# Patient Record
Sex: MALE | Race: WHITE | URBAN - METROPOLITAN AREA
[De-identification: names, ages, dates, MRNs, and addresses within clinical notes are randomized per-mention and may not be internally consistent; named-entity substitution may affect disease eponyms.]

---

## 2017-04-19 ENCOUNTER — TRANSFERRED RECORDS (OUTPATIENT)
Dept: HEALTH INFORMATION MANAGEMENT | Facility: CLINIC | Age: 82
End: 2017-04-19

## 2017-05-08 ENCOUNTER — TRANSFERRED RECORDS (OUTPATIENT)
Dept: HEALTH INFORMATION MANAGEMENT | Facility: CLINIC | Age: 82
End: 2017-05-08

## 2017-05-22 ENCOUNTER — TRANSFERRED RECORDS (OUTPATIENT)
Dept: HEALTH INFORMATION MANAGEMENT | Facility: CLINIC | Age: 82
End: 2017-05-22

## 2017-06-01 ENCOUNTER — TRANSFERRED RECORDS (OUTPATIENT)
Dept: HEALTH INFORMATION MANAGEMENT | Facility: CLINIC | Age: 82
End: 2017-06-01

## 2017-06-21 ENCOUNTER — TRANSFERRED RECORDS (OUTPATIENT)
Dept: HEALTH INFORMATION MANAGEMENT | Facility: CLINIC | Age: 82
End: 2017-06-21

## 2017-06-22 ENCOUNTER — TRANSFERRED RECORDS (OUTPATIENT)
Dept: HEALTH INFORMATION MANAGEMENT | Facility: CLINIC | Age: 82
End: 2017-06-22

## 2017-06-23 ENCOUNTER — TRANSFERRED RECORDS (OUTPATIENT)
Dept: HEALTH INFORMATION MANAGEMENT | Facility: CLINIC | Age: 82
End: 2017-06-23

## 2017-07-10 ENCOUNTER — MEDICAL CORRESPONDENCE (OUTPATIENT)
Dept: HEALTH INFORMATION MANAGEMENT | Facility: CLINIC | Age: 82
End: 2017-07-10

## 2017-07-10 ENCOUNTER — TRANSFERRED RECORDS (OUTPATIENT)
Dept: HEALTH INFORMATION MANAGEMENT | Facility: CLINIC | Age: 82
End: 2017-07-10

## 2017-07-14 ENCOUNTER — TRANSFERRED RECORDS (OUTPATIENT)
Dept: HEALTH INFORMATION MANAGEMENT | Facility: CLINIC | Age: 82
End: 2017-07-14

## 2017-07-21 ENCOUNTER — MEDICAL CORRESPONDENCE (OUTPATIENT)
Dept: HEALTH INFORMATION MANAGEMENT | Facility: CLINIC | Age: 82
End: 2017-07-21

## 2017-07-21 ENCOUNTER — TRANSFERRED RECORDS (OUTPATIENT)
Dept: HEALTH INFORMATION MANAGEMENT | Facility: CLINIC | Age: 82
End: 2017-07-21

## 2017-08-07 ENCOUNTER — OFFICE VISIT (OUTPATIENT)
Dept: RADIATION THERAPY | Facility: OUTPATIENT CENTER | Age: 82
End: 2017-08-07

## 2017-08-07 VITALS
HEART RATE: 59 BPM | WEIGHT: 199.6 LBS | OXYGEN SATURATION: 97 % | HEIGHT: 68 IN | RESPIRATION RATE: 16 BRPM | SYSTOLIC BLOOD PRESSURE: 160 MMHG | BODY MASS INDEX: 30.25 KG/M2 | DIASTOLIC BLOOD PRESSURE: 90 MMHG

## 2017-08-07 DIAGNOSIS — C34.31 MALIGNANT NEOPLASM OF LOWER LOBE OF RIGHT LUNG (H): Primary | ICD-10-CM

## 2017-08-07 DIAGNOSIS — C34.11 MALIGNANT NEOPLASM OF UPPER LOBE OF RIGHT LUNG (H): ICD-10-CM

## 2017-08-07 RX ORDER — HYDROCHLOROTHIAZIDE 25 MG/1
25 TABLET ORAL DAILY
COMMUNITY

## 2017-08-07 RX ORDER — SIMVASTATIN 20 MG
20 TABLET ORAL AT BEDTIME
COMMUNITY

## 2017-08-07 ASSESSMENT — PAIN SCALES - GENERAL: PAINLEVEL: NO PAIN (0)

## 2017-08-07 NOTE — MR AVS SNAPSHOT
After Visit Summary   2017    Timi Singh    MRN: [id number]           Patient Information     Date Of Birth          1935        Visit Information        Provider Department      2017 3:30 PM [facility] Rad; Kymberly Schulte MD [facility]        Today's Diagnoses     Malignant neoplasm of lower lobe of right lung (H)    -  1       Follow-ups after your visit        Who to contact     Please call your clinic at [phone] to:    Ask questions about your health    Make or cancel appointments    Discuss your medicines    Learn about your test results    Speak to your doctor   If you have compliments or concerns about an experience at your clinic, or if you wish to file a complaint, please contact [facility] Patient Relations at [phone] or email us at [email or web address]         Additional Information About Your Visit        MyChart Information     [employer] is an electronic gateway that provides easy, online access to your medical records. With [employer], you can request a clinic appointment, read your test results, renew a prescription or communicate with your care team.     To sign up for [employer] visit the website at [email or web address]   You will be asked to enter the access code listed below, as well as some personal information. Please follow the directions to create your username and password.     Your access code is: [email or web address]  Expires: 2017  2:50 PM     Your access code will  in 90 days. If you need help or a new code, please contact your [facility] Clinic or call [phone] for assistance.        Care EveryWhere ID     This is your Care EveryWhere ID. This could be used by other organizations to access your [place] medical records  [id number]         Blood Pressure from Last 3 Encounters:   17 160/90    Weight from Last 3 Encounters:   17 90.5 kg (199 lb 9.6 oz)               Today, you had the following     No orders found for display         Today's Medication Changes          These changes are accurate as of: 8/7/17  4:01 PM.  If you have any questions, ask your nurse or doctor.               Stop taking these medicines if you haven't already. Please contact your care team if you have questions.     erythromycin 2 % Oint   Stopped by:  Kymberly Schulte MD                    Primary Care Provider    None Specified       No primary provider on file.        Equal Access to Services     [facility]: Hadii mateo ku hadallyssao Soangelinaali, waaxda luqadaha, qaybta kaalmada adeeileenkirstenda, waxay idiin haygabemarcelina mckeeramajeffery cross . So [facility] [phone].    ATENCIÓN: Si waqas conti, tiene a anna disposición servicios gratuitos de asistencia lingüística. [facility] [phone].    We comply with applicable federal civil rights laws and [place] laws. We do not discriminate on the basis of race, color, national origin, age, disability sex, sexual orientation or gender identity.            Thank you!     Thank you for choosing [facility]  for your care. Our goal is always to provide you with excellent care. Hearing back from our patients is one way we can continue to improve our services. Please take a few minutes to complete the written survey that you may receive in the mail after your visit with us. Thank you!             Your Updated Medication List - Protect others around you: Learn how to safely use, store and throw away your medicines at www.disposemymeds.org.          This list is accurate as of: 8/7/17  4:01 PM.  Always use your most recent med list.                   Brand Name Dispense Instructions for use Diagnosis    aspirin 81 MG tablet      Take 81 mg by mouth daily        hydrochlorothiazide 25 MG tablet    HYDRODIURIL     Take 25 mg by mouth daily        omeprazole 20 MG CR capsule    priLOSEC     Take 20 mg by mouth daily        simvastatin 20 MG tablet    ZOCOR     Take  20 mg by mouth At Bedtime        SYNTHROID PO      Take 0.1 mcg by mouth daily

## 2017-08-07 NOTE — PROGRESS NOTES
RADIATION ONCOLOGY CONSULT NOTE  DATE OF CONSULTATION: 2017  CSN: [id number]    PATIENT NAME: Timi Singh  MRN: [id number]  : 1935    Jamison Guillermo MD  [place] [address] [place] [id number]    Dear Dr. Guillermo:    Thank you very much for referring this patient for consideration of radiotherapy. As you know Mr. Singh is a 82 year old male with a diagnosis of recurrent lung adenocarcinoma. He was seen today in consultation with Dr. Schulte for consideration of management of his local recurrence.      HISTORY OF PRESENT ILLNESS:    Mr. Timi Singh is a very pleasant 82 year old gentleman with left sided Bell s Palsy and recurrent, rcT1a N0 Mx / pT1b N0 Mx G2 RUL adenocarcinoma status post VATS resection to negative margins (2014) with no adjuvant therapy.      Mr. Singh' oncologic history begins in  at which time he underwent workup and diagnosis of Stage I RUL adenocarcinoma.  He had a VATS resection in 2014 to negative margins.      He had a CT on 2016 at which time findings showed a stable RLL and a new LLL measuring 3mm.      Most recently he had a Chest CT I- on 17 which showed post-op changes from the RUL removal.  There was a speculated RLL posteromedial, subpleural 1.8 cm mass.  He was then referred to Radiation Oncology for consideration of SBRT and then [facility]     He had an MRI brain on 17 which showed no evidence of intracranial metastasis.  Of note he had an enhancing left CN VII consistent with his diagnosis of bell s palsy.     A recent PET scan was done which demonstrated mild to moderate hypermetabolism of a mixed density pleural based pulmonary nodule in the RLL medially.  The pulmonary nodule was predominantly solid in appearance and radiology report measured it at 17 x 16 mm on this study, increase since prior comparison studies. Additionally noted was a small focus of mild hypermetabolism in the  "distal esophagus most likely to be inflammatory in nature, although cancer was not excluded.  A referral for GI consultation was placed by you, Dr. Guillermo, to evaluate this area of hypermetabolism.     The [facility] and therefore referred to our clinic today for consideration of radiotherapy without biopsy for the management of this lesion.     Subjectively today the patient reports he is in his usual state of health.  He denies new cough, fevers, hemoptysis, new SOB, weight loss or decline in functional status.      All pertinent labs, imaging, and pathology findings have been reviewed with details above.     REVIEW OF SYSTEMS: A 10 point review of systems was obtained. Pertinent findings are noted in the HPI and nursing note from today, but are otherwise unremarkable.     CHEMOTHERAPY HISTORY: None    RADIATION THERAPY HISTORY: None    IMPLANTABLE CARDIAC DEVICE: None    PAST MEDICAL /SURGICAL HISTORY:  No past medical history on file.  No past surgical history on file.    ALLERGIES:  Allergies as of 08/07/2017     (Not on File)       MEDICATIONS:  No current outpatient prescriptions on file.        FAMILY HISTORY:  No family history on file.    SOCIAL HISTORY:84 pack year smoking history (42 yrs x 2 ppd).  Pt quit 23 yrs prior  Social History     Social History     Marital status:      Spouse name: N/A     Number of children: N/A     Years of education: N/A     Occupational History     Not on file.     Social History Main Topics     Smoking status: Not on file     Smokeless tobacco: Not on file     Alcohol use Not on file     Drug use: Not on file     Sexual activity: Not on file     Other Topics Concern     Not on file     Social History Narrative       PHYSICAL EXAM:  Vital Signs: /90 (BP Location: Right arm, Cuff Size: Adult Large)  Pulse 59  Resp 16  Ht 1.727 m (5' 8\")  Wt 90.5 kg (199 lb 9.6 oz)  SpO2 97%  BMI 30.35 kg/m2  Gen: Alert, in " NAD  Eyes: EOMI, sclera anicteric  HENT      Head: NC/AT     Ears: No external auricular lesions     Nose/sinus: No rhinorrhea or epistaxis     Oral Cavity/Oropharynx: MMM, no thrush noted  Pulm: Breathing comfortably on room air, no audible wheezes or ronchi  CV: Well-perfused, no cyanosis  Abdominal: Soft, nondistended  Skin: Normal color and turgor  Neurologic: minor deficit in musculature of left face,  normal gait   Psych:    Orientation: Alert, attentive, and oriented to time, place, and person   Affect: Affect was appropriate to the situation and showed normal range and stability. No anxious mood   Speech: Speech was clear with normal fluency, rate, tone, and volume.   Memory: Although not formally assessed, immediate, recent, and remote memory appeared to be intact.    Insight and Judgement:  demonstrates adequate awareness of the issues discussed and was able to come to reasonable conclusions.   Thought: Thought patterns were coherent and logical.       ECOG PERFORMANCE STATUS: 1.  Pt can walk 2 flights of stairs without rest.     PFTs:   FEV 1 2.63 (100% predicted)  FVC 3.51 (95% predicted)   DLCO corrected 13.69 (46% predicted)  Hgb: 14.1    RADIOLOGY AND LABORATORIES: Relevant studies reviewed as above outlined in HPI.     ASSESSMENT/PLAN:  In summary, Mr. Timi Singh is a very pleasant 82 year old gentleman with left sided Bell s Palsy and recurrent, rcT1a N0 Mx / pT1b N0 Mx G2 RUL adenocarcinoma status post resection to negative margins.  He is an appropriate candidate for SBRT to the recurrent lesion for attempted local control.  Our intention for this treatment will be for cure of his recurrent disease.     We discussed that without biopsy proof, we cannot be 100% certain that this radiological abnormality represents recurrent cancer, however the appearance as well as clinical characteristics of Mr. Singh' are quite suggestive of recurrent cancer at this time. The patient is quite certain he does  not wish for any surgical intervention including biopsy.  We also discussed recommendations in order to get his esophageal hypermetabolism assessed should he wish to have definitive treatment. He however notes that he does not wish for any definitive treatment should the hypermetabolism in his esophagus represent any cancer.  We did discuss the fact that according to the radiologist's interpretation, it is more likely that this area represents an area of inflammation versus cancer.     We reviewed the rationale, logistics, risks, benefits, and potential side effects of the proposed treatment in detail with Mr. Singh.  We discussed a 5 fraction SBRT treatment given the vicinity of Mr. Curtis's lesion close to the chest wall.  We discussed that this would occur in 5 treatments spaced over the duration of approximately 3 weeks.     Mr. Singh had many questions during our conversation today, which were answered to the best of our ability. By the end of our consultation today, the patient decided to proceed with plans for treatment and signed an informed consent to that effect. After our consultation today we simulated the patient in our department.     Ishan Ruelas MD  Radiation Oncology Resident, PGY-3  [facility]  Phone: [phone]    Mr. Singh was seen and evaluated with staff, Dr. Schulte. Thank you, Dr. Guillermo, for allowing us to participate in this patient's care. Please feel free to call with any questions or concerns.      I have personally examined the patient, reviewed and edited the resident's note and agree with the plan of care.    Kymberly Schulte M.D., Ph.D.   Adjunct    Radiation Oncologist   [facility]   Radiation Therapy Center   Phone: [phone]    CC  Patient Care Team:  Kymberly Schulte MD as MD (Radiation Oncology)  Jamison Guillermo MD as MD (Internal Medicine)

## 2017-08-07 NOTE — PROGRESS NOTES
SIMULATION NOTE:   DIAGNOSIS: Clinically diagnosed right lung cancer, stage T1N0M0.     INDICATION: After discussion with patient about various treatment options, the patient elected to proceed with definitive radiation therapy using SBRT technique for his lung cancer. The patient is here for simulation.     CONSENT: The possible risks and side effects of radiation therapy have been discussed with the patient in detail and at a great length. The patient's questions are answered to patient's satisfaction. Written consent was obtained.     SIMULATION: Patient is in supine position with SBRT frame and VacLok to help keep the same position during the radiation therapy. Tentative isocenter is set in the center of thoracic region. We will acquire 4D scan to help us better locate target and design radiation therapy field. We are also going to use the respiratory gating technique to help us to better locate the movement of the tumor.     BLOCKS: Custom blocks will be drawn to minimize radiation to normal tissues and to protect normal organs including, but not limited to, spinal cord, brachial plexus, lungs, bronchial tree, esophagus, liver, heart/large vessels, spleen, stomach, small bowel, diaphragm, bone and soft tissue.     DOSAGE: I plan to give him radiation therapy at 1000 cGy each fraction to a total of 5000 cGy in 5 fractions over 2 weeks. I will consider using SBRT/IGRT technique to help us to better locate the target and to protect normal tissue.         Kymberly Schulte M.D., Ph.D.   Adjunct    Radiation Oncologist   [facility]   Radiation Therapy Center   Phone: [phone]

## 2017-08-07 NOTE — MR AVS SNAPSHOT
"              After Visit Summary   2017    Timi Singh    MRN: [id number]           Patient Information     Date Of Birth          1935        Visit Information        Provider Department      2017 1:30 PM Kymberly Schulte MD Radiation Therapy Center        Today's Diagnoses     Lung cancer (H)           Follow-ups after your visit        Who to contact     Please call your clinic at [phone] to:    Ask questions about your health    Make or cancel appointments    Discuss your medicines    Learn about your test results    Speak to your doctor   If you have compliments or concerns about an experience at your clinic, or if you wish to file a complaint, please contact [facility] Patient Relations at [phone] or email us at [email or web address]         Additional Information About Your Visit        MyChart Information     [employer] is an electronic gateway that provides easy, online access to your medical records. With [employer], you can request a clinic appointment, read your test results, renew a prescription or communicate with your care team.     To sign up for [employer] visit the website at [email or web address]   You will be asked to enter the access code listed below, as well as some personal information. Please follow the directions to create your username and password.     Your access code is: [email or web address]  Expires: 2017  2:50 PM     Your access code will  in 90 days. If you need help or a new code, please contact your [facility] or call [phone] for assistance.        Care EveryWhere ID     This is your Care EveryWhere ID. This could be used by other organizations to access your [place] medical records  [id number]        Your Vitals Were     Pulse Respirations Height Pulse Oximetry BMI (Body Mass Index)       59 16 1.727 m (5' 8\") 97% 30.35 kg/m2        Blood Pressure from Last 3 Encounters:   17 " 160/90    Weight from Last 3 Encounters:   08/07/17 90.5 kg (199 lb 9.6 oz)              Today, you had the following     No orders found for display         Today's Medication Changes          These changes are accurate as of: 8/7/17  3:55 PM.  If you have any questions, ask your nurse or doctor.               Stop taking these medicines if you haven't already. Please contact your care team if you have questions.     erythromycin 2 % Oint   Stopped by:  Kymberly Schulte MD                    Primary Care Provider    None Specified       No primary provider on file.        Equal Access to Services     : Hadii mateo blanchard hadallyssao Sotramaine, waaxda luqadaha, qaybta kaalmanell shore, lucy cross . So [facility] [phone].    ATENCIÓN: Si habla español, tiene a anna disposición servicios gratuitos de asistencia lingüística. Llame al [phone].    We comply with applicable federal civil rights laws and [place] laws. We do not discriminate on the basis of race, color, national origin, age, disability sex, sexual orientation or gender identity.            Thank you!     Thank you for choosing [facility]  for your care. Our goal is always to provide you with excellent care. Hearing back from our patients is one way we can continue to improve our services. Please take a few minutes to complete the written survey that you may receive in the mail after your visit with us. Thank you!             Your Updated Medication List - Protect others around you: Learn how to safely use, store and throw away your medicines at www.disposemymeds.org.          This list is accurate as of: 8/7/17  3:55 PM.  Always use your most recent med list.                   Brand Name Dispense Instructions for use Diagnosis    aspirin 81 MG tablet      Take 81 mg by mouth daily        hydrochlorothiazide 25 MG tablet    HYDRODIURIL     Take 25 mg by mouth daily        omeprazole 20 MG CR capsule    priLOSEC      Take 20 mg by mouth daily        simvastatin 20 MG tablet    ZOCOR     Take 20 mg by mouth At Bedtime        SYNTHROID PO      Take 0.1 mcg by mouth daily

## 2017-08-07 NOTE — NURSING NOTE
"REASON FOR APPOINTMENT   Right lower lobe lung cancer. Not interested in surgery. Here to discuss radiation therapy treatment options.   Hx right lung resection 2014 (RUL)    PERSONAL HISTORY OF CANCER   Previous Cancer ? no   Prior Radiation ? no   Prior Chemotherapy ? no   Prior Hormonal Therapy ? no     RECENT IMAGING STUDIES  [facility]    REFERRALS NEEDED  Not at this time    VITALS  /90 (BP Location: Right arm, Cuff Size: Adult Large)  Pulse 59  Resp 16  Ht 1.727 m (5' 8\")  Wt 90.5 kg (199 lb 9.6 oz)  SpO2 97%  BMI 30.35 kg/m2    PACEMAKER/IMPLANTED CARDIAC DEVICE : No    PAIN  Denies    PSYCHOSOCIAL  Marital Status:   Patient lives in [place], [place] with his wife.  Number of children: 2  Working status: retired [employer]  Do you feel safe in your home? Yes    REVIEW OF SYSTEMS  Skin: negative  Eyes: glasses  Ears/Nose/Throat: negative  Respiratory: sob with walking level surfaces, does not wear oxygen, denies cough  Cardiovascular: exercise intolerance  Gastrointestinal: reflux  Genitourinary: negative  Musculoskeletal: generalized arthritis pain  Neurologic: negative  Psychiatric: negative  Hematologic/Lymphatic/Immunologic: negative  Endocrine: thyroid disorder    Radiation Oncology Patient Teaching    Person involved with teaching: Patient, Wife and Daughter  Patient asked Questions: Yes  Patient was cooperative: Yes  Patient was receptive (willing to accept information given): Yes    Education Assessment  Comprehension ability: High  Knowledge level: Medium  Factors affecting teaching: None    Education Materials Given  Radiation Therapy and You  Caring for Your Skin During Radiation ...  Eating Hints  Diet and Nutrition Information    Educational Topics Discussed  Side effects, Medications, Pain management, Activity, Nutrition, Adjustment to illness and Community resources    Response To Teaching  Verbalizes understanding    Do you have an advanced directive or living will? " yes  Are you DNR/DNI? no

## 2017-08-07 NOTE — LETTER
8/7/2017      RE: Timi Singh  [address]   [facility] [address]       SIMULATION NOTE:   DIAGNOSIS: Clinically diagnosed right lung cancer, stage T1N0M0.     INDICATION: After discussion with patient about various treatment options, the patient elected to proceed with definitive radiation therapy using SBRT technique for his lung cancer. The patient is here for simulation.     CONSENT: The possible risks and side effects of radiation therapy have been discussed with the patient in detail and at a great length. The patient's questions are answered to patient's satisfaction. Written consent was obtained.     SIMULATION: Patient is in supine position with SBRT frame and VacLok to help keep the same position during the radiation therapy. Tentative isocenter is set in the center of thoracic region. We will acquire 4D scan to help us better locate target and design radiation therapy field. We are also going to use the respiratory gating technique to help us to better locate the movement of the tumor.     BLOCKS: Custom blocks will be drawn to minimize radiation to normal tissues and to protect normal organs including, but not limited to, spinal cord, brachial plexus, lungs, bronchial tree, esophagus, liver, heart/large vessels, spleen, stomach, small bowel, diaphragm, bone and soft tissue.     DOSAGE: I plan to give him radiation therapy at 1000 cGy each fraction to a total of 5000 cGy in 5 fractions over 2 weeks. I will consider using SBRT/IGRT technique to help us to better locate the target and to protect normal tissue.         Kymberly Schulte M.D., Ph.D.   Adjunct    Radiation Oncologist   [facility]   Radiation Therapy Center   Phone: [phone]      SBRT Special Treatment Procedure Note  Date of Service: August 7, 2017    Diagnosis:  Clinical diagnosed lung cancer, stage T1N0M0    Medical Indication:  To escalate the radiotherapy dose to a curative dose (5000  cGy) using stereotactic body radiotherapy technique and to spare surrounding lung and normal tissues from excessive toxicity. SBRT planning was completed today to prepare for lung cancer treatment.  SBRT planning is chosen to avoid the critical structures, which cannot be done adequately with conventional planning due to the dose constraints of the normal surrounding critical organs.  In addition, the treatment will be high dose per fraction with complete course to be done in 5 sessions. The patient previously had CT scan acquisition for planning and special treatment aids used include.  The patient was simulated in a supine position. After the contouring of the GTV, ITV (MIPS 90%) and MIPS Averages, a clinical tumor volume (CTV), expanded volume of planned treatment volume (PTV) was carried out on the treatment planning system.  Critical structures outlined included both right and left lung, brachial plexus, spinal cord, esophagus, and airway. Extra efforts were required to immobilize the patient using the custom designed stereotactic frame as well as planning.  Extra efforts during the planning process included contouring of critical structures, GTV, ITV, CTV, PTV and evaluating the DVH and coverage of the PTV.    The patient is going to have SBRT technique for his lung cancer. I have explained to the patient this is a very complicated process which will require an extensive amount of time for planning, delivering and monitoring the patient. The patient understands well and wished to proceed.         Kymberly Schulte M.D., Ph.D.   Adjunct    Radiation Oncologist   [facility]   Radiation Therapy Center   Phone: [phone]      Clinical Treatment Planning Note    The complex radiotherapy planning will be completed for his lung cancer. The patient had a planning CT earlier today for planning. The treatment aids were used for planning, including headrest and SBRT Board to help  keep the same position during the daily radiation therapy. The therapy planning is necessary to reduce radiotherapy dose to the normal critical organs which are not possible with simple treatment. In addition, dose to the target and the critical structures requires three-dimensional analysis of the isodose distribution. The planning will be done to reduce dose to the spinal cord, lungs, bronchial tree, esophagus, liver, heart/large vessels, spleen, stomach, small bowel, diaphragm, bone and soft tissue.   I will contour the clinical tumor volume  CTV , with expanded volume of planning treatment volume  PTV  on the treatment planning system. The critical structures will be outlined, including spinal cord, brachial plexus, lungs, bronchial tree, esophagus, liver, heart/large vessels, spleen, stomach, small bowel, diaphragm, bone and soft tissue.   Treatment planning will be done on the computer treatment planning system. The 7-10 field will be used to achieve optimal coverage of the target volume. Dose distribution to the above critical structures will be reviewed. Isodose distribution along with the X, Y, Z plan will also be reviewed. Custom blocking will be used to shield normal structures. The beam s eye views will be reviewed and the digital reconstructed image will be reviewed on the planning software. The patient will receive 5000 cGy in 5 treatments targeted to the lung tumors using 6 MV photons with SBRT/IMRT/IGRT.        Kymberly Schulte M.D., Ph.D.   Adjunct    Radiation Oncologist   [facility]   Radiation Therapy Center   Phone: [phone]        Kymberly Schulte MD

## 2017-08-07 NOTE — LETTER
Date:August 8, 2017      Provider requested that no letter be sent. Do not send.       [facility] Health Information

## 2017-08-07 NOTE — PROGRESS NOTES
SBRT Special Treatment Procedure Note  Date of Service: August 7, 2017    Diagnosis:  Clinical diagnosed lung cancer, stage T1N0M0    Medical Indication:  To escalate the radiotherapy dose to a curative dose (5000 cGy) using stereotactic body radiotherapy technique and to spare surrounding lung and normal tissues from excessive toxicity. SBRT planning was completed today to prepare for lung cancer treatment.  SBRT planning is chosen to avoid the critical structures, which cannot be done adequately with conventional planning due to the dose constraints of the normal surrounding critical organs.  In addition, the treatment will be high dose per fraction with complete course to be done in 5 sessions. The patient previously had CT scan acquisition for planning and special treatment aids used include.  The patient was simulated in a supine position. After the contouring of the GTV, ITV (MIPS 90%) and MIPS Averages, a clinical tumor volume (CTV), expanded volume of planned treatment volume (PTV) was carried out on the treatment planning system.  Critical structures outlined included both right and left lung, brachial plexus, spinal cord, esophagus, and airway. Extra efforts were required to immobilize the patient using the custom designed stereotactic frame as well as planning.  Extra efforts during the planning process included contouring of critical structures, GTV, ITV, CTV, PTV and evaluating the DVH and coverage of the PTV.    The patient is going to have SBRT technique for his lung cancer. I have explained to the patient this is a very complicated process which will require an extensive amount of time for planning, delivering and monitoring the patient. The patient understands well and wished to proceed.         Kymberly Schulte M.D., Ph.D.   Adjunct    Radiation Oncologist   [facility]   Radiation Therapy Center   Phone: [phone]

## 2017-08-07 NOTE — LETTER
2017    RE: Tmii Singh  [address]   [place] [address]       RADIATION ONCOLOGY CONSULT NOTE  DATE OF CONSULTATION: 2017  CSN: [id number]    PATIENT NAME: Timi Singh  MRN: [id number]  : 1935    Jamison Guillermo MD  [facility]  [address] [place] [address]    Dear Dr. Guillermo:    Thank you very much for referring this patient for consideration of radiotherapy. As you know Mr. Singh is a 82 year old male with a diagnosis of recurrent lung adenocarcinoma. He was seen today in consultation with Dr. Schulte for consideration of management of his local recurrence.      HISTORY OF PRESENT ILLNESS:    Mr. Timi Singh is a very pleasant 82 year old gentleman with left sided Bell s Palsy and recurrent, rcT1a N0 Mx / pT1b N0 Mx G2 RUL adenocarcinoma status post VATS resection to negative margins (2014) with no adjuvant therapy.      Mr. Singh' oncologic history begins in  at which time he underwent workup and diagnosis of Stage I RUL adenocarcinoma.  He had a VATS resection in 2014 to negative margins.      He had a CT on 2016 at which time findings showed a stable RLL and a new LLL measuring 3mm.      Most recently he had a Chest CT I- on 17 which showed post-op changes from the RUL removal.  There was a speculated RLL posteromedial, subpleural 1.8 cm mass.  He was then referred to Radiation Oncology for consideration of SBRT and then [facility]     He had an MRI brain on 17 which showed no evidence of intracranial metastasis.  Of note he had an enhancing left CN VII consistent with his diagnosis of bell s palsy.     A recent PET scan was done which demonstrated mild to moderate hypermetabolism of a mixed density pleural based pulmonary nodule in the RLL medially.  The pulmonary nodule was predominantly solid in appearance and radiology report measured it at 17 x 16 mm on this study, increase since prior  comparison studies. Additionally noted was a small focus of mild hypermetabolism in the distal esophagus most likely to be inflammatory in nature, although cancer was not excluded.  A referral for GI consultation was placed by juanito, Dr. Guillermo, to evaluate this area of hypermetabolism.     The [facility] and therefore referred to our clinic today for consideration of radiotherapy without biopsy for the management of this lesion.     Subjectively today the patient reports he is in his usual state of health.  He denies new cough, fevers, hemoptysis, new SOB, weight loss or decline in functional status.      All pertinent labs, imaging, and pathology findings have been reviewed with details above.     REVIEW OF SYSTEMS: A 10 point review of systems was obtained. Pertinent findings are noted in the HPI and nursing note from today, but are otherwise unremarkable.     CHEMOTHERAPY HISTORY: None    RADIATION THERAPY HISTORY: None    IMPLANTABLE CARDIAC DEVICE: None    PAST MEDICAL /SURGICAL HISTORY:  No past medical history on file.  No past surgical history on file.    ALLERGIES:  Allergies as of 08/07/2017     (Not on File)       MEDICATIONS:  No current outpatient prescriptions on file.        FAMILY HISTORY:  No family history on file.    SOCIAL HISTORY:84 pack year smoking history (42 yrs x 2 ppd).  Pt quit 23 yrs prior  Social History     Social History     Marital status:      Spouse name: N/A     Number of children: N/A     Years of education: N/A     Occupational History     Not on file.     Social History Main Topics     Smoking status: Not on file     Smokeless tobacco: Not on file     Alcohol use Not on file     Drug use: Not on file     Sexual activity: Not on file     Other Topics Concern     Not on file     Social History Narrative       PHYSICAL EXAM:  Vital Signs: /90 (BP Location: Right arm, Cuff Size: Adult Large)  Pulse 59  Resp 16  Ht 1.727 m  "(5' 8\")  Wt 90.5 kg (199 lb 9.6 oz)  SpO2 97%  BMI 30.35 kg/m2  Gen: Alert, in NAD  Eyes: EOMI, sclera anicteric  HENT      Head: NC/AT     Ears: No external auricular lesions     Nose/sinus: No rhinorrhea or epistaxis     Oral Cavity/Oropharynx: MMM, no thrush noted  Pulm: Breathing comfortably on room air, no audible wheezes or ronchi  CV: Well-perfused, no cyanosis  Abdominal: Soft, nondistended  Skin: Normal color and turgor  Neurologic: minor deficit in musculature of left face,  normal gait   Psych:    Orientation: Alert, attentive, and oriented to time, place, and person   Affect: Affect was appropriate to the situation and showed normal range and stability. No anxious mood   Speech: Speech was clear with normal fluency, rate, tone, and volume.   Memory: Although not formally assessed, immediate, recent, and remote memory appeared to be intact.    Insight and Judgement:  demonstrates adequate awareness of the issues discussed and was able to come to reasonable conclusions.   Thought: Thought patterns were coherent and logical.       ECOG PERFORMANCE STATUS: 1.  Pt can walk 2 flights of stairs without rest.     PFTs:   FEV 1 2.63 (100% predicted)  FVC 3.51 (95% predicted)   DLCO corrected 13.69 (46% predicted)  Hgb: 14.1    RADIOLOGY AND LABORATORIES: Relevant studies reviewed as above outlined in HPI.     ASSESSMENT/PLAN:  In summary, Mr. Timi Singh is a very pleasant 82 year old gentleman with left sided Bell s Palsy and recurrent, rcT1a N0 Mx / pT1b N0 Mx G2 RUL adenocarcinoma status post resection to negative margins.  He is an appropriate candidate for SBRT to the recurrent lesion for attempted local control.  Our intention for this treatment will be for cure of his recurrent disease.     We discussed that without biopsy proof, we cannot be 100% certain that this radiological abnormality represents recurrent cancer, however the appearance as well as clinical characteristics of Mr. Singh' are quite " suggestive of recurrent cancer at this time. The patient is quite certain he does not wish for any surgical intervention including biopsy.  We also discussed recommendations in order to get his esophageal hypermetabolism assessed should he wish to have definitive treatment. He however notes that he does not wish for any definitive treatment should the hypermetabolism in his esophagus represent any cancer.  We did discuss the fact that according to the radiologist's interpretation, it is more likely that this area represents an area of inflammation versus cancer.     We reviewed the rationale, logistics, risks, benefits, and potential side effects of the proposed treatment in detail with Mr. Singh.  We discussed a 5 fraction SBRT treatment given the vicinity of Mr. Curtis's lesion close to the chest wall.  We discussed that this would occur in 5 treatments spaced over the duration of approximately 3 weeks.     Mr. Singh had many questions during our conversation today, which were answered to the best of our ability. By the end of our consultation today, the patient decided to proceed with plans for treatment and signed an informed consent to that effect. After our consultation today we simulated the patient in our department.     Ishan Ruelas MD  Radiation Oncology Resident, PGY-3  [facility]  Phone: [phone]    Mr. Singh was seen and evaluated with staff, Dr. Schulte. Thank you, Dr. Guillermo, for allowing us to participate in this patient's care. Please feel free to call with any questions or concerns.      I have personally examined the patient, reviewed and edited the resident's note and agree with the plan of care.    Kymberly Schulte M.D., Ph.D.   Adjunct    Radiation Oncologist   [facility]   Radiation Therapy Center   Phone: [phone]    CC  Patient Care Team:  Kymberly Schulte MD as MD (Radiation Oncology)  Jamison Guillermo MD as MD (Internal  Medicine)

## 2017-08-07 NOTE — PROGRESS NOTES
Clinical Treatment Planning Note    The complex radiotherapy planning will be completed for his lung cancer. The patient had a planning CT earlier today for planning. The treatment aids were used for planning, including headrest and SBRT Board to help keep the same position during the daily radiation therapy. The therapy planning is necessary to reduce radiotherapy dose to the normal critical organs which are not possible with simple treatment. In addition, dose to the target and the critical structures requires three-dimensional analysis of the isodose distribution. The planning will be done to reduce dose to the spinal cord, lungs, bronchial tree, esophagus, liver, heart/large vessels, spleen, stomach, small bowel, diaphragm, bone and soft tissue.   I will contour the clinical tumor volume  CTV , with expanded volume of planning treatment volume  PTV  on the treatment planning system. The critical structures will be outlined, including spinal cord, brachial plexus, lungs, bronchial tree, esophagus, liver, heart/large vessels, spleen, stomach, small bowel, diaphragm, bone and soft tissue.   Treatment planning will be done on the computer treatment planning system. The 7-10 field will be used to achieve optimal coverage of the target volume. Dose distribution to the above critical structures will be reviewed. Isodose distribution along with the X, Y, Z plan will also be reviewed. Custom blocking will be used to shield normal structures. The beam s eye views will be reviewed and the digital reconstructed image will be reviewed on the planning software. The patient will receive 5000 cGy in 5 treatments targeted to the lung tumors using 6 MV photons with SBRT/IMRT/IGRT.        Kymberly Schulte M.D., Ph.D.   Adjunct    Radiation Oncologist   [facility]   Radiation Therapy Center   Phone: [phone]

## 2017-08-23 ENCOUNTER — OFFICE VISIT (OUTPATIENT)
Dept: RADIATION THERAPY | Facility: OUTPATIENT CENTER | Age: 82
End: 2017-08-23

## 2017-08-23 VITALS
BODY MASS INDEX: 29.95 KG/M2 | SYSTOLIC BLOOD PRESSURE: 178 MMHG | WEIGHT: 197 LBS | HEART RATE: 72 BPM | DIASTOLIC BLOOD PRESSURE: 96 MMHG

## 2017-08-23 DIAGNOSIS — C34.31 MALIGNANT NEOPLASM OF LOWER LOBE OF RIGHT LUNG (H): Primary | ICD-10-CM

## 2017-08-23 NOTE — PROGRESS NOTES
[facility]  SPECIALIZING IN BREAKTHROUGHS  Radiation Oncology    On Treatment Visit Note      Timi Singh      Date: 2017   MRN: [id number]   : 1935  Diagnosis: T1N0 clinical dx right lung cancer      Reason for Visit:  On Radiation Treatment Visit     Treatment Summary to Date  Treatment Site: right lung Current Dose: 1000/5000 cGy Fractions: 1/5      Chemotherapy  Chemo concurrent with radx?: No    Subjective:       Nursing ROS:   Nutrition Alteration  Diet Type: Patient's Preference  Skin  Skin Reaction: 0 - No changes  Cardiovascular  Respiratory effort: 2 - Mild dyspnea on exertion  Genitourinary  Urinary Status: 0 - Normal  Pain Assessment  0-10 Pain Scale: 0    Objective:   BP (!) 178/96  Pulse 72  Wt 89.4 kg (197 lb)  BMI 29.95 kg/m2    Assessment:    Tolerating radiation therapy well.  All questions and concerns addressed.    Plan:   1. Continue current therapy.      Mosaiq chart and setup information reviewed  MVCT/IGRT images checked  Medication Review  Med list reviewed with patient?: Yes           Kymberly Schulte MD

## 2017-08-23 NOTE — MR AVS SNAPSHOT
After Visit Summary   8/23/2017    Timi Singh    MRN: [id number]           Patient Information     Date Of Birth          1935        Visit Information        Provider Department      8/23/2017 2:45 PM Kymberly Schulte MD [facility]        Today's Diagnoses     Malignant neoplasm of lower lobe of right lung (H)    -  1       Follow-ups after your visit        Your next 10 appointments already scheduled     Aug 24, 2017  2:15 PM CDT   SBRT CLARIBEL with Lr [facility] Rad Tech   [facility] ([facility])    [address], [address]  [facility] [address]   [phone]            Aug 25, 2017  2:15 PM CDT   SBRT CLARIBEL with L[facility] Rad Tech   [facility] ([facility])    [address], [address]  [facility] [address]   [phone]            Aug 28, 2017  2:15 PM CDT   SBRT CLARIBEL with L[facility] Rad Tech   [facility] ([facility])    [address], [address]  [facility] [address]   [phone]            Aug 29, 2017  2:15 PM CDT   SBRT CLARIBEL with L[facility] Rad Tech   [facility] ([facility])    [address], [address]  [facility] [address]   [phone]            Aug 30, 2017  2:15 PM CDT   SBRT CLARIBEL with [facility] Rad Tech   [facility] ([facility])    [address], [address]  [facility] [address]   [phone]            Aug 30, 2017  2:45 PM CDT   ON TREATMENT VISIT with Kymberly Schulte MD   [facility] ([facility])    [address], [address]  [facility] [address]   [phone]            Aug 31, 2017  2:15 PM CDT   SBRT CLARIBEL with [facility] Rad Tech   [facility] ([facility])    [address], [address]  [facility] [address]   [phone]            Sep 01, 2017  2:15 PM CDT   SBRT CLARIBEL with [facility] Rad Tech   [facility] ([facility])    [address], [address] [place] [address]  [place] [address]   [phone]              Who to contact     Please call your clinic at [phone] to:    Ask questions about your health    Make or cancel appointments    Discuss your medicines    Learn about your test results    Speak to your doctor   If you have compliments or concerns about an experience at your clinic, or if you wish to file a complaint, please contact [facility] Patient Relations at [phone] or email us at [email or web address]         Additional Information About Your Visit        Kent[employer] Information     [employer] gives you secure access to your electronic health record. If you see a primary care provider, you can also send messages to your care team and make appointments. If you have questions, please call your primary care clinic.  If you do not have a primary care provider, please call [phone] and they will assist you.      [employer] is an electronic gateway that provides easy, online access to your medical records. With [employer], you can request a clinic appointment, read your test results, renew a prescription or communicate with your care team.     To access your existing account, please contact your [facility] or call [phone] for assistance.        Care EveryWhere ID     This is your Care EveryWhere ID. This could be used by other organizations to access your [place] medical records  [id number]        Your Vitals Were     Pulse BMI (Body Mass Index)                72 29.95 kg/m2           Blood Pressure from Last 3 Encounters:   08/23/17 (!) 178/96   08/07/17 160/90    Weight from Last 3 Encounters:   08/23/17 89.4 kg (197 lb)   08/07/17 90.5 kg (199 lb 9.6 oz)              Today, you had the following     No orders found for display       Primary Care Provider    None Specified       No primary provider on file.        Equal Access to Services     LUCIUS SINGH : marion Seals,  roman davisalnichelle gomestracy jeaniein hayaamarcelina marybel cross ah. So [facility] [phone].    ATENCIÓN: Si waqas conti, tiene a anna disposición servicios gratuitos de asistencia lingüística. Karla al [phone].    We comply with applicable federal civil rights laws and [place] laws. We do not discriminate on the basis of race, color, national origin, age, disability sex, sexual orientation or gender identity.            Thank you!     Thank you for choosing [facility]  for your care. Our goal is always to provide you with excellent care. Hearing back from our patients is one way we can continue to improve our services. Please take a few minutes to complete the written survey that you may receive in the mail after your visit with us. Thank you!             Your Updated Medication List - Protect others around you: Learn how to safely use, store and throw away your medicines at www.disposemymeds.org.          This list is accurate as of: 8/23/17  3:37 PM.  Always use your most recent med list.                   Brand Name Dispense Instructions for use Diagnosis    aspirin 81 MG tablet      Take 81 mg by mouth daily        hydrochlorothiazide 25 MG tablet    HYDRODIURIL     Take 25 mg by mouth daily        omeprazole 20 MG CR capsule    priLOSEC     Take 20 mg by mouth daily        simvastatin 20 MG tablet    ZOCOR     Take 20 mg by mouth At Bedtime        SYNTHROID PO      Take 0.1 mcg by mouth daily

## 2017-08-23 NOTE — LETTER
2017      RE: Timi Singh  [address]   [facility] [address]       [facility] PHYSICIANS  SPECIALIZING IN BREAKTHROUGHS  Radiation Oncology    On Treatment Visit Note      Timi Singh      Date: 2017   MRN: [id number]   : 1935  Diagnosis: T1N0 clinical dx right lung cancer      Reason for Visit:  On Radiation Treatment Visit     Treatment Summary to Date  Treatment Site: right lung Current Dose: 1000/5000 cGy Fractions: 1/5      Chemotherapy  Chemo concurrent with radx?: No    Subjective:       Nursing ROS:   Nutrition Alteration  Diet Type: Patient's Preference  Skin  Skin Reaction: 0 - No changes  Cardiovascular  Respiratory effort: 2 - Mild dyspnea on exertion  Genitourinary  Urinary Status: 0 - Normal  Pain Assessment  0-10 Pain Scale: 0    Objective:   BP (!) 178/96  Pulse 72  Wt 89.4 kg (197 lb)  BMI 29.95 kg/m2    Assessment:    Tolerating radiation therapy well.  All questions and concerns addressed.    Plan:   1. Continue current therapy.      Mosaiq chart and setup information reviewed  MVCT/IGRT images checked  Medication Review  Med list reviewed with patient?: Yes           MD Kymberly De La Rosa MD

## 2017-08-23 NOTE — LETTER
Date:August 24, 2017      Provider requested that no letter be sent. Do not send.       [facility] Health Information

## 2017-08-30 ENCOUNTER — OFFICE VISIT (OUTPATIENT)
Dept: RADIATION THERAPY | Facility: OUTPATIENT CENTER | Age: 82
End: 2017-08-30

## 2017-08-30 VITALS
HEART RATE: 69 BPM | WEIGHT: 198.4 LBS | DIASTOLIC BLOOD PRESSURE: 78 MMHG | BODY MASS INDEX: 30.17 KG/M2 | SYSTOLIC BLOOD PRESSURE: 146 MMHG

## 2017-08-30 DIAGNOSIS — C34.31 MALIGNANT NEOPLASM OF LOWER LOBE OF RIGHT LUNG (H): Primary | ICD-10-CM

## 2017-08-30 NOTE — PROGRESS NOTES
[facility] PHYSICIANS  SPECIALIZING IN BREAKTHROUGHS  Radiation Oncology    On Treatment Visit Note      Timi Singh      Date: 2017   MRN: [id number]   : 1935  Diagnosis: T1N0 clinical dx right lung cancer      Reason for Visit:  On Radiation Treatment Visit     Treatment Summary to Date  Treatment Site: right lung Current Dose: 4000/5000 cGy Fractions: 4/5      Chemotherapy  Chemo concurrent with radx?: No    Subjective:       Nursing ROS:   Nutrition Alteration  Diet Type: Patient's Preference  Skin  Skin Reaction: 0 - No changes  Cardiovascular  Respiratory effort: 2 - Mild dyspnea on exertion  Gastrointestinal  Nausea: 0 - None  Genitourinary  Urinary Status: 0 - Normal  Pain Assessment  0-10 Pain Scale: 0    Objective:   /78  Pulse 69  Wt 90 kg (198 lb 6.4 oz)  BMI 30.17 kg/m2    Assessment:    Tolerating radiation therapy well.  All questions and concerns addressed.    Plan:   1. Continue current therapy.    2. [facility] He therefore will be followed with our clinic p.r.nRachele Villedaiq chart and setup information reviewed  MVCT/IGRT images checked  Medication Review  Med list reviewed with patient?: Yes           Kymberly Schulte MD

## 2017-08-30 NOTE — LETTER
2017      RE: Timi Singh  [address]   [facility] [address]       [facility] PHYSICIANS  SPECIALIZING IN BREAKTHROUGHS  Radiation Oncology    On Treatment Visit Note      Timi Singh      Date: 2017   MRN: [id number]   : 1935  Diagnosis: T1N0 clinical dx right lung cancer      Reason for Visit:  On Radiation Treatment Visit     Treatment Summary to Date  Treatment Site: right lung Current Dose: 4000/5000 cGy Fractions: 4/5      Chemotherapy  Chemo concurrent with radx?: No    Subjective:       Nursing ROS:   Nutrition Alteration  Diet Type: Patient's Preference  Skin  Skin Reaction: 0 - No changes  Cardiovascular  Respiratory effort: 2 - Mild dyspnea on exertion  Gastrointestinal  Nausea: 0 - None  Genitourinary  Urinary Status: 0 - Normal  Pain Assessment  0-10 Pain Scale: 0    Objective:   /78  Pulse 69  Wt 90 kg (198 lb 6.4 oz)  BMI 30.17 kg/m2    Assessment:    Tolerating radiation therapy well.  All questions and concerns addressed.    Plan:   1. Continue current therapy.    2. Patient [facility] He therefore will be followed with our clinic p.r.n.      Mosaiq chart and setup information reviewed  MVCT/IGRT images checked  Medication Review  Med list reviewed with patient?: Yes           MD Kymberly De La Rosa MD

## 2017-08-30 NOTE — LETTER
Date:August 31, 2017      Provider requested that no letter be sent. Do not send.       [facility] Health Information

## 2017-08-30 NOTE — MR AVS SNAPSHOT
After Visit Summary   8/30/2017    Timi Singh    MRN: [id number]           Patient Information     Date Of Birth          1935        Visit Information        Provider Department      8/30/2017 2:45 PM Kymberly Schulte MD [facility]        Today's Diagnoses     Malignant neoplasm of lower lobe of right lung (H)    -  1       Follow-ups after your visit        Your next 10 appointments already scheduled     Sep 01, 2017  2:15 PM CDT   SBRT CLARIBEL with [facility]   Rad[facility] ([facility])    [address], [address]  [facility] [address]   [phone]              Who to contact     Please call your clinic at [phone] to:    Ask questions about your health    Make or cancel appointments    Discuss your medicines    Learn about your test results    Speak to your doctor   If you have compliments or concerns about an experience at your clinic, or if you wish to file a complaint, please contact [facility] Patient Relations at [phone] or email us at [email or web address]         Additional Information About Your Visit        MyChart Information     [employer] gives you secure access to your electronic health record. If you see a primary care provider, you can also send messages to your care team and make appointments. If you have questions, please call your primary care clinic.  If you do not have a primary care provider, please call [phone] and they will assist you.      [employer] is an electronic gateway that provides easy, online access to your medical records. With [employer], you can request a clinic appointment, read your test results, renew a prescription or communicate with your care team.     To access your existing account, please contact your [facility] or call [phone] for assistance.        Care EveryWhere ID     This is your Care EveryWhere ID. This could be used by  other organizations to access your [place] medical records  [id number]        Your Vitals Were     Pulse BMI (Body Mass Index)                69 30.17 kg/m2           Blood Pressure from Last 3 Encounters:   08/30/17 146/78   08/23/17 (!) 178/96   08/07/17 160/90    Weight from Last 3 Encounters:   08/30/17 90 kg (198 lb 6.4 oz)   08/23/17 89.4 kg (197 lb)   08/07/17 90.5 kg (199 lb 9.6 oz)              Today, you had the following     No orders found for display       Primary Care Provider    None Specified       No primary provider on file.        Equal Access to Services     [facility]: Mila Estrada, marion henry, roman shore, lucy cross . So [facility] [phone].    ATENCIÓN: Si habla español, tiene a anna disposición servicios gratuitos de asistencia lingüística. Llame al [phone].    We comply with applicable federal civil rights laws and [place] laws. We do not discriminate on the basis of race, color, national origin, age, disability sex, sexual orientation or gender identity.            Thank you!     Thank you for choosing [facility]  for your care. Our goal is always to provide you with excellent care. Hearing back from our patients is one way we can continue to improve our services. Please take a few minutes to complete the written survey that you may receive in the mail after your visit with us. Thank you!             Your Updated Medication List - Protect others around you: Learn how to safely use, store and throw away your medicines at www.disposemymeds.org.          This list is accurate as of: 8/30/17  3:34 PM.  Always use your most recent med list.                   Brand Name Dispense Instructions for use Diagnosis    aspirin 81 MG tablet      Take 81 mg by mouth daily        hydrochlorothiazide 25 MG tablet    HYDRODIURIL     Take 25 mg by mouth daily        omeprazole 20 MG CR capsule    priLOSEC     Take 20 mg  by mouth daily        simvastatin 20 MG tablet    ZOCOR     Take 20 mg by mouth At Bedtime        SYNTHROID PO      Take 0.1 mcg by mouth daily

## 2017-09-01 ENCOUNTER — OFFICE VISIT (OUTPATIENT)
Dept: RADIATION THERAPY | Facility: OUTPATIENT CENTER | Age: 82
End: 2017-09-01

## 2017-09-01 DIAGNOSIS — C34.31 MALIGNANT NEOPLASM OF LOWER LOBE OF RIGHT LUNG (H): Primary | ICD-10-CM

## 2017-09-01 NOTE — PROGRESS NOTES
2017        PATIENT NAME: Timi Singh  MRN: [id number]  : 1935     [place] [address]     Dear Dr. Guillermo:     Your patient Mr. Timi Singh completed his radiation therapy on . As you know Mr. Singh is a 82 year old male with a diagnosis of recurrent lung adenocarcinoma. He received stereotactic radiotherapy for his lung cancer and had radiation therapy in our clinic over the past 2 weeks with a total dose of 5000 cGy in 5 treatments. He tolerated radiation therapy very well and denies any significant acute side effects at the end of therapy. [facility] He therefore will be followed in our clinic p.r.n.    Again, thank you very much for the referral and allowing me to participate in the care of this gentleman. If you have any questions, please do not hesitate to call.    Sincerely,        Kymberly Schulte M.D., Ph.D.   Adjunct    Radiation Oncologist   [facility]   Radiation Therapy Center   Phone: [phone]    CC  Patient Care Team:  Kymberly Schulte MD as MD (Radiation Oncology)  Jamison Guillermo MD as MD (Internal Medicine)  REFERRING DR, UNKNOWN

## 2017-09-01 NOTE — LETTER
2017    RE: Timi Singh  [address]   [facility] [address]     2017        PATIENT NAME: Timi Singh  MRN: [id number]  : 1935     Jamison Guillermo MD  [facility]  [address] [place] [address]     Dear Dr. Guillermo:     Your patient Mr. Timi Singh completed his radiation therapy on . As you know Mr. Singh is a 82 year old male with a diagnosis of recurrent lung adenocarcinoma. He received stereotactic radiotherapy for his lung cancer and had radiation therapy in our clinic over the past 2 weeks with a total dose of 5000 cGy in 5 treatments. He tolerated radiation therapy very well and denies any significant acute side effects at the end of therapy. [facility] He therefore will be followed in our clinic p.r.n.    Again, thank you very much for the referral and allowing me to participate in the care of this gentleman. If you have any questions, please do not hesitate to call.    Sincerely,    Kymberly Schulte M.D., Ph.D.   Adjunct    Radiation Oncologist   [facility]   Radiation Therapy Center   Phone: [phone]    CC  Patient Care Team:  Kymberly Schulte MD as MD (Radiation Oncology)  Jamison Guillermo MD as MD (Internal Medicine)  REFERRING DR, UNKNOWN

## 2017-09-01 NOTE — MR AVS SNAPSHOT
After Visit Summary   9/1/2017    Timi Singh    MRN: [id number]           Patient Information     Date Of Birth          1935        Visit Information        Provider Department      9/1/2017 2:30 PM Kymberly Schulte MD [facility]        Today's Diagnoses     Malignant neoplasm of lower lobe of right lung (H)    -  1       Follow-ups after your visit        Who to contact     Please call your clinic at [phone] to:    Ask questions about your health    Make or cancel appointments    Discuss your medicines    Learn about your test results    Speak to your doctor   If you have compliments or concerns about an experience at your clinic, or if you wish to file a complaint, please contact [facility] Patient Relations at [phone] or email us at [email or web address]         Additional Information About Your Visit        MyChart Information     [employer] gives you secure access to your electronic health record. If you see a primary care provider, you can also send messages to your care team and make appointments. If you have questions, please call your primary care clinic.  If you do not have a primary care provider, please call [phone] and they will assist you.      [employer] is an electronic gateway that provides easy, online access to your medical records. With [employer], you can request a clinic appointment, read your test results, renew a prescription or communicate with your care team.     To access your existing account, please contact your [facility] or call [phone] for assistance.        Care EveryWhere ID     This is your Care EveryWhere ID. This could be used by other organizations to access your [place] medical records  [id number]         Blood Pressure from Last 3 Encounters:   08/30/17 146/78   08/23/17 (!) 178/96   08/07/17 160/90    Weight from Last 3 Encounters:   08/30/17 90 kg (198 lb 6.4 oz)    08/23/17 89.4 kg (197 lb)   08/07/17 90.5 kg (199 lb 9.6 oz)              Today, you had the following     No orders found for display       Primary Care Provider    None Specified       No primary provider on file.        Equal Access to Services     LUCIUS SINGH : Hadii mateo blanchard erna Estrada, wawarrenda luqadaha, qaybta kaalmada silviano, lucy jeaniein hayaamarcelina toddeileen kaur america brink. So [facility] [phone].    ATENCIÓN: Si habla español, tiene a anna disposición servicios gratuitos de asistencia lingüística. Llame al [phone].    We comply with applicable federal civil rights laws and [place] laws. We do not discriminate on the basis of race, color, national origin, age, disability sex, sexual orientation or gender identity.            Thank you!     Thank you for choosing [facility]  for your care. Our goal is always to provide you with excellent care. Hearing back from our patients is one way we can continue to improve our services. Please take a few minutes to complete the written survey that you may receive in the mail after your visit with us. Thank you!             Your Updated Medication List - Protect others around you: Learn how to safely use, store and throw away your medicines at www.disposemymeds.org.          This list is accurate as of: 9/1/17  4:20 PM.  Always use your most recent med list.                   Brand Name Dispense Instructions for use Diagnosis    aspirin 81 MG tablet      Take 81 mg by mouth daily        hydrochlorothiazide 25 MG tablet    HYDRODIURIL     Take 25 mg by mouth daily        omeprazole 20 MG CR capsule    priLOSEC     Take 20 mg by mouth daily        simvastatin 20 MG tablet    ZOCOR     Take 20 mg by mouth At Bedtime        SYNTHROID PO      Take 0.1 mcg by mouth daily

## 2017-09-15 ENCOUNTER — TELEPHONE (OUTPATIENT)
Dept: INFUSION THERAPY | Facility: CLINIC | Age: 82
End: 2017-09-15

## 2017-09-15 NOTE — TELEPHONE ENCOUNTER
Received call from Debra Ayon with the [facility], requesting information regarding pt's visits in August. They need this information to help process pt's insurance claims. Opened chart to see when pt was here and found that he is being treated by Radiation Therapy, not being seen in the Oncology/Infusion Clinic. Information was passed on to Luz in X[facility] and she will [facility]. Ayla Dumont RN

## 2020-03-11 ENCOUNTER — HEALTH MAINTENANCE LETTER (OUTPATIENT)
Age: 85
End: 2020-03-11

## 2020-12-27 ENCOUNTER — HEALTH MAINTENANCE LETTER (OUTPATIENT)
Age: 85
End: 2020-12-27

## 2021-04-25 ENCOUNTER — HEALTH MAINTENANCE LETTER (OUTPATIENT)
Age: 86
End: 2021-04-25

## 2021-10-09 ENCOUNTER — HEALTH MAINTENANCE LETTER (OUTPATIENT)
Age: 86
End: 2021-10-09

## 2022-05-21 ENCOUNTER — HEALTH MAINTENANCE LETTER (OUTPATIENT)
Age: 87
End: 2022-05-21

## 2022-09-17 ENCOUNTER — HEALTH MAINTENANCE LETTER (OUTPATIENT)
Age: 87
End: 2022-09-17

## 2023-06-04 ENCOUNTER — HEALTH MAINTENANCE LETTER (OUTPATIENT)
Age: 88
End: 2023-06-04